# Patient Record
Sex: FEMALE | Race: WHITE
[De-identification: names, ages, dates, MRNs, and addresses within clinical notes are randomized per-mention and may not be internally consistent; named-entity substitution may affect disease eponyms.]

---

## 2017-01-26 NOTE — ER DOCUMENT REPORT
ED Medical Screen (RME)





- General


Stated Complaint: FOOT PAIN


Mode of Arrival: Ambulatory


Information source: Patient


Notes: 


She presents to the emergency department with complaints of right plantar foot 

pain for one month.  She is on her feet all day long.  Denies trauma.





I have greeted and performed a rapid initial assessment of this patient.  A 

comprehensive ED assessment and evaluation of the patient, analysis of test 

results and completion of the medical decision making process will be conducted 

by additional ED providers.


TRAVEL OUTSIDE OF THE [place] IN LAST 30 DAYS: No





Past Medical History


Past Surgical History: Reports: Hx Tonsillectomy





- Immunizations


Hx Diphtheria, Pertussis, Tetanus Vaccination: No





Physical Exam





- Vital signs


Vitals: 





 











Temp Pulse Resp BP Pulse Ox


 


 98.0 F   80   16   123/72   99 


 


 01/26/17 10:08  01/26/17 10:08  01/26/17 10:08  01/26/17 10:08  01/26/17 10:08














Course





- Vital Signs


Vital signs: 





 











Temp Pulse Resp BP Pulse Ox


 


 98.0 F   80   16   123/72   99 


 


 01/26/17 10:08  01/26/17 10:08  01/26/17 10:08  01/26/17 10:08  01/26/17 10:08

## 2018-08-07 NOTE — RADIOLOGY REPORT (SQ)
EXAM DESCRIPTION:  MRI RT LOWER JOINT WITHOUT



COMPLETED DATE/TIME:  8/6/2018 5:25 pm



REASON FOR STUDY:  PAIN IN RIGHT ANKLE AND JOINTS OF RIGHT M25.571  PAIN IN RIGHT ANKLE AND JOINTS OF
 RIGHT FOOT



COMPARISON:  None.



TECHNIQUE:  Right ankle images acquired and stored on PACS. Multiplanar images include fat sensitive 
sequences as T1, fluid sensitive sequences as FST2/STIR, cartilage sensitive sequences as FSPD, and g
radient echo sequences.



LIMITATIONS:  None.



FINDINGS:  BONE MARROW: Subchondral edema midline tibial plafond and along the posteromedial margin o
f the talus.

EFFUSIONS: Small ankle and subtalar joint effusions.  No definite loose bodies are identified.

OSSEOUS ARTICULATIONS: Intact.

TALAR DOME AND TIBIAL PLAFOND: Intact cartilage. No osteochondral defect.

ACHILLES TENDON: Intact without partial or full-thickness tear.  No adjacent bursal fluid or edema.

TIBIALIS ANTERIOR TENDON: Intact without edema at the 1st MT attachment.

TIBIALIS POSTERIOR TENDON: Normal morphology and no edema at the navicular attachment. No tendon shea
th fluid.

FLEXOR HALLUCIS LONGUS AND FLEXOR DIGITORUM TENDONS: Normal morphology and no tendon sheath fluid. No
 edema of the os trigonum.

PERONEUS LONGUS AND BREVIS TENDON: Chevron sign in the peroneus brevis tendon.  Mildly increased flui
d in the tendon sheath.

ATFL, CFL, PTFL: The ATFL is torn.  The CFL is attenuated but intact.  Tibiofibular ligaments intact.


DELTOID LIGAMENT: Visualized components intact.

TARSAL TUNNEL: No masses. No muscle atrophy.

SINUS TARSI: No fluid. No reactive marrow edema or erosions.

PLANTAR FASCIA: No signal alteration or tear.

ADJACENT SOFT TISSUES: No masses.

OTHER: No other significant finding.



IMPRESSION:

1. Torn ATFL.

2. Split tear of the peroneus brevis tendon.

3. Mild subchondral edema in the tibial plafond and medial margin of the talus.



TECHNICAL DOCUMENTATION:  JOB ID: [id number]

 2011 [employer]- All Rights Reserved



Reading location - IP/workstation name: [email or web address]

## 2020-05-13 ENCOUNTER — HOSPITAL ENCOUNTER (INPATIENT)
Dept: HOSPITAL 62 - LR | Age: 35
LOS: 3 days | Discharge: HOME | End: 2020-05-16
Attending: OBSTETRICS & GYNECOLOGY | Admitting: OBSTETRICS & GYNECOLOGY
Payer: MEDICAID

## 2020-05-13 DIAGNOSIS — O48.0: Primary | ICD-10-CM

## 2020-05-13 DIAGNOSIS — Z14.1: ICD-10-CM

## 2020-05-13 DIAGNOSIS — Z3A.40: ICD-10-CM

## 2020-05-13 LAB
ADD MANUAL DIFF: NO
BARBITURATES UR QL SCN: NEGATIVE
BASOPHILS # BLD AUTO: 0 10^3/UL (ref 0–0.2)
BASOPHILS NFR BLD AUTO: 0.3 % (ref 0–2)
EOSINOPHIL # BLD AUTO: 0.1 10^3/UL (ref 0–0.6)
EOSINOPHIL NFR BLD AUTO: 0.8 % (ref 0–6)
ERYTHROCYTE [DISTWIDTH] IN BLOOD BY AUTOMATED COUNT: 12.9 % (ref 11.5–14)
HCT VFR BLD CALC: 35.3 % (ref 36–47)
HGB BLD-MCNC: 12.6 G/DL (ref 12–15.5)
LYMPHOCYTES # BLD AUTO: 2.4 10^3/UL (ref 0.5–4.7)
LYMPHOCYTES NFR BLD AUTO: 20.7 % (ref 13–45)
MCH RBC QN AUTO: 32 PG (ref 27–33.4)
MCHC RBC AUTO-ENTMCNC: 35.5 G/DL (ref 32–36)
MCV RBC AUTO: 90 FL (ref 80–97)
METHADONE UR QL SCN: NEGATIVE
MONOCYTES # BLD AUTO: 0.8 10^3/UL (ref 0.1–1.4)
MONOCYTES NFR BLD AUTO: 6.6 % (ref 3–13)
NEUTROPHILS # BLD AUTO: 8.2 10^3/UL (ref 1.7–8.2)
NEUTS SEG NFR BLD AUTO: 71.6 % (ref 42–78)
PCP UR QL SCN: NEGATIVE
PLATELET # BLD: 280 10^3/UL (ref 150–450)
RBC # BLD AUTO: 3.92 10^6/UL (ref 3.72–5.28)
TOTAL CELLS COUNTED % (AUTO): 100 %
URINE AMPHETAMINES SCREEN: NEGATIVE
URINE BENZODIAZEPINES SCREEN: NEGATIVE
URINE COCAINE SCREEN: NEGATIVE
URINE MARIJUANA (THC) SCREEN: NEGATIVE
WBC # BLD AUTO: 11.5 10^3/UL (ref 4–10.5)

## 2020-05-13 PROCEDURE — 86850 RBC ANTIBODY SCREEN: CPT

## 2020-05-13 PROCEDURE — 80307 DRUG TEST PRSMV CHEM ANLYZR: CPT

## 2020-05-13 PROCEDURE — 85025 COMPLETE CBC W/AUTO DIFF WBC: CPT

## 2020-05-13 PROCEDURE — 86901 BLOOD TYPING SEROLOGIC RH(D): CPT

## 2020-05-13 PROCEDURE — 94760 N-INVAS EAR/PLS OXIMETRY 1: CPT

## 2020-05-13 PROCEDURE — 36415 COLL VENOUS BLD VENIPUNCTURE: CPT

## 2020-05-13 PROCEDURE — 86900 BLOOD TYPING SEROLOGIC ABO: CPT

## 2020-05-13 PROCEDURE — 86592 SYPHILIS TEST NON-TREP QUAL: CPT

## 2020-05-13 PROCEDURE — 85027 COMPLETE CBC AUTOMATED: CPT

## 2020-05-13 NOTE — ADMISSION PHYSICAL
=================================================================



=================================================================

Datetime Report Generated by CPN: 2020 18:58

   

   

=================================================================

CURRENT ADMISSION

=================================================================

   

Chief Complaint:  Scheduled Induction of Labor

Indication for Induction:  Not Applicable

Admit Impression :  Term, Intrauterine Pregnancy; No Active Labor;

   Intact Membranes; Induction of Labor

Admit Plan:  Admit to Unit; Initiate Labor Induction Protocol

   

=================================================================

ALLERGIES

=================================================================

   

Medication Allergies:  Yes

Medication Allergies:  Penicillins (2020); amoxicillin

   (2020); meperidine (2020)

Latex:  No Latex Allergies

   

=================================================================

OBSTETRICAL HISTORY

=================================================================

   

EDC:  2020 00:00

:  2

Para:  1

Term:  1

:  1

SAB:  1

IAB:  1

Ectopic:  0

Livin

Cesareans:  0

VBACs:  0

Multiple Births:  0

Gestational Diabetes:  No

Rh Sensitization:  No

Incompetent Cervix:  No

MORE:  No

Infertility:  No

ART Treatment:  No

Uterine Anomaly:  No

IUGR:  No

Hx Previous C/S:  No

Macrosomia:  No

Hx Loss/Stillborn:  No

PIH:  No

Hx  Death:  No

Placenta Previa/Abruption:  No

Depression/PP Depression:  No

PTL/PROM:  No

Post Partum Hemorrhage:  No

Current Pregnancy Procedures:  Ultrasound

Obstetrical History Comments:  G1- 41wks  viable baby boy 

   G2- Current 

   

=================================================================

***SEE PRENATAL RECORDS***

=================================================================

   

Alcohol:  No

Marijuana :  No

Cocaine:  No

Other Illicit Drugs:  No

Cigarettes:  Never Smoker. [id number]

   

=================================================================

MEDICAL HISTORY

=================================================================

   

Diabetes:  No

Blood Transfusion:  No

Pulmonary Disease (Asthma, TB):  No

Breast Disease:  No

Hypertension:  No

Gyn Surgery:  No

Heart Disease:  No

Hosp/Surgery:  Yes

Autoimmune Disorder:  No

Anesthetic Complications:  No

Kidney Disease:  No

Abnormal Pap Smear:  No

Neuro/Epilepsy:  No

Psychiatric Disorders:  No

Other Medical Diseases:  No

Hepatitis/Liver Disease:  No

Significant Family History:  No

Varicosities/Phlebitis:  No

Trauma/Violence :  No

Thyroid Dysfunction:  No

   

=================================================================

INFECTIOUS HISTORY

=================================================================

   

Gonorrhea:  No

Genital Herpes:  No

Chlamydia:  No

Tuberculosis:  No

Syphilis:  No

Hepatitis:  No

HIV/AIDS Exposure:  No

Rash or Viral Illness:  No

HPV:  No

   

=================================================================

PHYSICAL EXAM

=================================================================

   

General:  Normal

HEENT:  Normal

Neurologic:  Normal

Thyroid:  Normal

Heart:  Normal

Lungs:  Normal

Breast:  Normal

Back:  Normal

Abdomen:  Normal

Genitourinary Exam:  Normal

Extremities:  Normal

DTRs:  Normal

Pelvic Type:  Adequate

Vital Signs:  Reviewed; Within Normal Limits

   

=================================================================

VAGINAL EXAM

=================================================================

   

Dilatation:  1

Effacement:  50

Station:  -2

Contraction Comments:  no regular 

   

=================================================================

MEMBRANES

=================================================================

   

Membranes:  Intact

   

=================================================================

FETUS A

=================================================================

   

EGA:  40.0

Monitoring:  External US

FHR- Baseline:  145

Variability:  Moderate 6-25bpm

Accelerations:  15X15

Decelerations:  None

FHR Category:  Category I

Fetal Presentation:  Vertex

Admit Comment:  34 yo  at 40.0 wks EGA for IOL at 41.1 wks EGA

   for post dates

   -Admit LDR for scheduled IOL

   -Ice chips only when in active labor. Start IVFs : LR at 125 cc/hr

   now

   -CEFM and [place]

   -GBS negative, A positive blood type, G/C negative, RI, varicella

   immune

   -Cervidil 10mg PV this PM

   -Hx of one prior , anticipate 

      

   

=================================================================

PLANS FOR LABOR AND DELIVERY

=================================================================

   

Labor and Delivery:  None

Pain Management:  None

Feeding Preference:  Formula

Benefit of Breast Feed Discussed:  Yes

Circumcision:  Yes

   

=================================================================

INFORMED CONSENT

=================================================================

   

Informed Consent Obtained:  Vaginal Delivery;  Section

   Delivery; Induction of Labor; Risks, Benefits and Alternatives

   Discussed

Signature:  Electronically signed by Gayathri Fletcher MD on 2020 at

   18:58  with User ID: [email or web address]

:  Electronically signed by Gayathri Fletcher MD on 2020 at 18:58 

   with User ID: [email or web address]

## 2020-05-14 RX ADMIN — DOCUSATE SODIUM SCH MG: 100 CAPSULE, LIQUID FILLED ORAL at 17:50

## 2020-05-14 RX ADMIN — IBUPROFEN SCH MG: 800 TABLET, FILM COATED ORAL at 14:07

## 2020-05-14 RX ADMIN — IBUPROFEN SCH MG: 800 TABLET, FILM COATED ORAL at 21:35

## 2020-05-14 RX ADMIN — FERROUS SULFATE TAB 325 MG (65 MG ELEMENTAL FE) SCH MG: 325 (65 FE) TAB at 17:50

## 2020-05-14 RX ADMIN — FAMOTIDINE SCH MG: 20 TABLET, FILM COATED ORAL at 21:35

## 2020-05-14 NOTE — WARNING SIGNS IN BABIES
=================================================================

VOD Warning Signs

=================================================================

Datetime Report Generated by [facility]: 05/14/2020 13:23

   

VOD#608 -Warning Signs in Babies:  Needs to be viewed.    (05/13/2020

   18:12:Adilene Prather RN)

## 2020-05-14 NOTE — DELIVERY SUMMARY
=================================================================

Del Sum A-C

=================================================================

Datetime Report Generated by CPN: 2020 14:00

   

   

=================================================================

DELIVERY PERSONNEL

=================================================================

   

DELIVERY PERSONNEL:  [id number]

Delivery Doctor::  Kelly Lira CNM

Nurse Midwife Certified::  Kelly Lira CNM

Labor and Delivery Nurse::  Adilene Prather RN

Nursery Nurse::  Nicci Mooney RN

Scrsteffany Tech/CNA:  Adele Dill CNA II

   

=================================================================

MATERNAL INFORMATION

=================================================================

   

Delivery Anesthesia:  None

Medications After Delivery:  Pitocin 30 Units in 500ml NS/D5W

Delivery QBL:  150

Maternal Complications:  None

Provider Comments:  pt quickly progressed to c/c/+1 with urge to push,

   began pushing and able to deliver the fetal head at which point

   patient stopped pushing. Fetal head rotated from IRVIN to EMERSON then

   patient began pushing again and able to deliver anterior shoulder

   then quickly delivered the rest of the body. Baby with vigorous

   respiratory effort and cry with tactile stimulation. Baby placed on

   maternal abdomen, cord allowed to stop pulsating then clamped x2 and

   cut by FOB (cord blood collected,3vc noted). Placenta delivered

   spontaneously intact with central insertion. Fundus firm @ u-2,

   bleeding stable. Vaginal and perineal inspection revealed no

   lacerations. Mother and baby remain skin to skin and bonding at this

   time. 

      

   

=================================================================

LABOR SUMMARY

=================================================================

   

EDC:  2020 00:00

No. Babies in Womb:  1

 Attempted:  No

Labor Anesthesia:  None

   

=================================================================

LABOR INFORMATION

=================================================================

   

Reason for Induction:  Post Dates

Onset of Labor:  2020 05:32

Complete Dilatation:  2020 11:34

Cervical Ripening Agents:  Cytotec @

Oxytocin:  Induction

Group B Beta Strep:  NEGATIVE

Antibiotics # of Doses:  0

Antibiotics Time of Last Dose:  N/A

Steroids Given:  None

Reason Steroids Not Administered:  Not Applicable

   

=================================================================

MEMBRANES

=================================================================

   

Membranes Rupture Method:  Artificial

Rupture of Membranes:  2020 08:40

Length of Rupture (hr):  3.50

Amniotic Fluid Color:  Clear

Amniotic Fluid Amount:  Moderate

Amniotic Fluid Odor:  None

   

=================================================================

STAGES OF LABOR

=================================================================

   

Stage 1 hr:  6

Stage 1 min:  2

Stage 2 hr:  0

Stage 2 min:  36

Stage 3 hr:  0

Stage 3 min:  12

Total Time in Labor hr:  6

Total Time in Labor min:  50

   

=================================================================

VAGINAL DELIVERY

=================================================================

   

Episiotomy:  None

Laceration #1:  None

Laceration Extension #1:  N/A

Laceration Repair:  Not Applicable

Sponge Count Correct:  Yes

Sharps Count Correct:  N/A

   

=================================================================

CSECTION DELIVERY

=================================================================

   

Primary Indication:  N/A

Secondary Indication:  N/A

CSection Incidence:  N/A

Labor:  N/A

Elective:  N/A

CSection Incision:  N/A

   

=================================================================

BABY A INFORMATION

=================================================================

   

Infant Delivery Date/Time:  2020 12:10

Method of Delivery:  Vaginal

Nurse Controlled Delivery:  No

Born in Route :  No

:  N/A

Forceps:  N/A

Vacuum Extraction:  N/A

Shoulder Dystocia :  No

   

=================================================================

PRESENTATION/POSITION BABY A

=================================================================

   

Presentation:  Cephalic

Cephalic Presentation:  Vertex

Vertex Position:  Right Occipital Anterior

Breech Presentation:  N/A

   

=================================================================

PLACENTA INFORMATION BABY A

=================================================================

   

Placenta Delivery Time :  2020 12:22

Placenta Method of Delivery:  Spontaneous

Placenta Status:  Delivered

   

=================================================================

APGAR SCORES BABY A

=================================================================

   

Heart Rate 1 min:  >100 bpm

Resp Effort 1 min:  Good Cry

Reflex Irritability 1 min:  Cough or Sneeze or Pulls Away

Muscle Tone 1 min:  Active Motion

Color 1 min:  Blue/Pale

Resuscitation Effort 1 min:  Tactile Stimulation

APGAR SCORE 1 MIN:  8

Heart Rate 5 min:  >100 bpm

Resp Effort 5 min:  Good Cry

Reflex Irritability 5 min:  Cough or Sneeze or Pulls Away

Muscle Tone 5 min:  Active Motion

Color 5 min:  Body Pink, Extremities Blue

Resuscitation Effort 5 min:  N/A

APGAR SCORE 5 MIN:  9

Resuscitation Effort 10 min:  N/A

   

=================================================================

INFANT INFORMATION BABY A

=================================================================

   

Gestational Age at Delivery:  41.1

Gestational Status:  Late Term-  41- 41.6 Weeks

Infant Outcome :  Liveborn

Infant Condition :  Stable

Infant Sex:  Male

   

=================================================================

IDENTIFICATION BABY A

=================================================================

   

Infant Verification Date/Time:  2020 13:31

ID Band Number:  [id number]

Mother's Name Verified:  Yes

Infant Medical Record Number:  [id number]

RN Verifying Infant:  JNiebuhr,RN

Additional Verifying Personnel:  SCamp,RN

   

=================================================================

WEIGHT/LENGTH BABY A

=================================================================

   

Infant Birthweight (gm):  3690

Infant Weight (lb):  8

Infant Weight (oz):  2

Infant Length (in):  19.50

Infant Length (cm):  49.53

   

=================================================================

CORD INFORMATION BABY A

=================================================================

   

No. Cord Vessels:  3

Nuchal Cord :  N/A

Cord Blood Taken:  Yes-For Storage (Mom's Blood type +)

Infant Suction:  None

   

=================================================================

ASSESSMENT BABY A

=================================================================

   

Infant Complications:  None

Physical Findings at Delivery:  Molding of the Head

Infant Respirations:  Appears Normal

Skin to Skin:  Yes

Skin to Skin Time (min):  30

Neonatologist/ALS Called :  No

Infant Care By:  CONNIE Azul

Transferred To:  Remains with Mother

   

=================================================================

BABY B INFORMATION

=================================================================

   

 :  N/A

   

=================================================================

SIGNATURES

=================================================================

   

Assignment:  Antwon Mendez MD

Signature:  Electronically signed by Kelly Lira CNM on

   2020 at 12:58  with User ID: [email or web address]

:  Electronically signed by Kelly Lira CNM on 2020 at

   12:58  with User ID: [email or web address]

## 2020-05-15 LAB
ERYTHROCYTE [DISTWIDTH] IN BLOOD BY AUTOMATED COUNT: 13.2 % (ref 11.5–14)
HCT VFR BLD CALC: 29.2 % (ref 36–47)
HGB BLD-MCNC: 10.4 G/DL (ref 12–15.5)
MCH RBC QN AUTO: 32.8 PG (ref 27–33.4)
MCHC RBC AUTO-ENTMCNC: 35.5 G/DL (ref 32–36)
MCV RBC AUTO: 92 FL (ref 80–97)
PLATELET # BLD: 241 10^3/UL (ref 150–450)
RBC # BLD AUTO: 3.17 10^6/UL (ref 3.72–5.28)
WBC # BLD AUTO: 15.8 10^3/UL (ref 4–10.5)

## 2020-05-15 RX ADMIN — IBUPROFEN SCH MG: 800 TABLET, FILM COATED ORAL at 05:44

## 2020-05-15 RX ADMIN — FERROUS SULFATE TAB 325 MG (65 MG ELEMENTAL FE) SCH MG: 325 (65 FE) TAB at 10:41

## 2020-05-15 RX ADMIN — DOCUSATE SODIUM SCH MG: 100 CAPSULE, LIQUID FILLED ORAL at 10:41

## 2020-05-15 RX ADMIN — SENNOSIDES, DOCUSATE SODIUM SCH EACH: 50; 8.6 TABLET, FILM COATED ORAL at 10:41

## 2020-05-15 RX ADMIN — Medication SCH CAP: at 10:41

## 2020-05-15 RX ADMIN — IBUPROFEN SCH MG: 800 TABLET, FILM COATED ORAL at 22:46

## 2020-05-15 RX ADMIN — DOCUSATE SODIUM SCH MG: 100 CAPSULE, LIQUID FILLED ORAL at 17:34

## 2020-05-15 RX ADMIN — FAMOTIDINE SCH MG: 20 TABLET, FILM COATED ORAL at 10:41

## 2020-05-15 RX ADMIN — IBUPROFEN SCH MG: 800 TABLET, FILM COATED ORAL at 13:27

## 2020-05-15 RX ADMIN — FAMOTIDINE SCH MG: 20 TABLET, FILM COATED ORAL at 22:46

## 2020-05-15 RX ADMIN — FERROUS SULFATE TAB 325 MG (65 MG ELEMENTAL FE) SCH MG: 325 (65 FE) TAB at 17:34

## 2020-05-15 NOTE — PDOC PROGRESS REPORT
Subjective-OB


Progress Note for:: 05/15/20


Subjective: 


Pt doing well, no concerns.  She reports light bleeding, reg diet and voiding 

without difficulty. 








Physical Exam (OB)


Vital Signs: 


                                        











Temp Pulse Resp BP Pulse Ox


 


 97.9 F   78   18   100/63   99 


 


 05/15/20 07:21  05/15/20 07:21  05/15/20 07:21  05/15/20 07:21  05/15/20 07:21








                                 Intake & Output











 05/14/20 05/15/20 05/16/20





 06:59 06:59 06:59


 


Weight 110.5 kg  














- PIH/Pre-Eclampsia


Clonus: Negative


Headache: Absent


Epigastric Pain: No


Visual Changes: No





- Lochia


Lochia Amount: Small 10-25 ml


Lochia Color: Rubra/Red





- Abdomen


Description: Soft, Round


Hernia Present: No


Fundal Description: Firm, Midline


Fundal Height: u/u - u/2





Objective-Diagnostic


Laboratory: 


                                        





                                 05/15/20 06:30 





                                        











  05/15/20





  06:30


 


WBC  15.8 H


 


RBC  3.17 L


 


Hgb  10.4 L D


 


Hct  29.2 L


 


MCV  92


 


MCH  32.8


 


MCHC  35.5


 


RDW  13.2


 


Plt Count  241














Assessment and Plan(PN)





- Assessment and Plan


(1) Delivery normal


Is this a current diagnosis for this admission?: Yes   





(2) Encounter for induction of labor


Is this a current diagnosis for this admission?: Yes   





(3) Gestational diabetes mellitus (GDM) affecting second pregnancy


Is this a current diagnosis for this admission?: Yes   





- Time Spent with Patient


Time with patient: Less than 15 minutes


Medications reviewed and adjusted accordingly: Yes





- Disposition


Anticipated Discharge: Home


Within: within 24 hours

## 2020-05-16 VITALS — DIASTOLIC BLOOD PRESSURE: 64 MMHG | SYSTOLIC BLOOD PRESSURE: 114 MMHG

## 2020-05-16 RX ADMIN — SENNOSIDES, DOCUSATE SODIUM SCH EACH: 50; 8.6 TABLET, FILM COATED ORAL at 10:23

## 2020-05-16 RX ADMIN — FAMOTIDINE SCH MG: 20 TABLET, FILM COATED ORAL at 10:23

## 2020-05-16 RX ADMIN — FERROUS SULFATE TAB 325 MG (65 MG ELEMENTAL FE) SCH MG: 325 (65 FE) TAB at 10:23

## 2020-05-16 RX ADMIN — Medication SCH CAP: at 10:23

## 2020-05-16 RX ADMIN — IBUPROFEN SCH MG: 800 TABLET, FILM COATED ORAL at 05:36

## 2020-05-16 RX ADMIN — DOCUSATE SODIUM SCH MG: 100 CAPSULE, LIQUID FILLED ORAL at 10:23

## 2020-05-16 NOTE — PDOC DISCHARGE SUMMARY
Impression





- Admit/DC Date/PCP


Admission Date/Primary Care Provider: 


  05/13/20 18:03





  LIZBETH SHEFFIELD MD





Discharge Date: 05/16/20





- Discharge Diagnosis


(1) Delivery normal


Is this a current diagnosis for this admission?: Yes   





(2) Encounter for induction of labor


Is this a current diagnosis for this admission?: Yes   





(3) Gestational diabetes mellitus (GDM) affecting second pregnancy


Is this a current diagnosis for this admission?: Yes   





- Additional Information


Resuscitation Status: Full Code


Discharge Diet: Regular


Discharge Activity: Balance Activity w/Rest, Pelvic Rest


Referrals: 


LIZBETH SHEFFIELD MD [Primary Care Provider] - 


Prescriptions: 


Ibuprofen [Motrin 800 mg Tablet] 800 mg PO Q8HP PRN #60 tablet


 PRN Reason: 


Home Medications: 








Prenatal Vits96/Iron Fum/Folic [Prenatal Tablet] 1 each PO DAILY 05/13/20 


Ibuprofen [Motrin 800 mg Tablet] 800 mg PO Q8HP PRN #60 tablet 05/16/20 











HPI


Gestational Age: 41.1


Reason(s) for Admission: Induction of Labor, Gestional Diabetes


Prenatal Procedures: NST


Intrapartum Procedure(s): Spontaneous Vaginal Delivery





Results


Laboratory Results: 


                                        











WBC  15.8 10^3/uL (4.0-10.5)  H  05/15/20  06:30    


 


RBC  3.17 10^6/uL (3.72-5.28)  L  05/15/20  06:30    


 


Hgb  10.4 g/dL (12.0-15.5)  L D 05/15/20  06:30    


 


Hct  29.2 % (36.0-47.0)  L  05/15/20  06:30    


 


MCV  92 fl (80-97)   05/15/20  06:30    


 


MCH  32.8 pg (27.0-33.4)   05/15/20  06:30    


 


MCHC  35.5 g/dL (32.0-36.0)   05/15/20  06:30    


 


RDW  13.2 % (11.5-14.0)   05/15/20  06:30    


 


Plt Count  241 10^3/uL (150-450)   05/15/20  06:30    


 


Lymph % (Auto)  20.7 % (13-45)   05/13/20  19:05    


 


Mono % (Auto)  6.6 % (3-13)   05/13/20  19:05    


 


Eos % (Auto)  0.8 % (0-6)   05/13/20  19:05    


 


Baso % (Auto)  0.3 % (0-2)   05/13/20  19:05    


 


Absolute Neuts (auto)  8.2 10^3/uL (1.7-8.2)   05/13/20  19:05    


 


Absolute Lymphs (auto)  2.4 10^3/uL (0.5-4.7)   05/13/20  19:05    


 


Absolute Monos (auto)  0.8 10^3/uL (0.1-1.4)   05/13/20  19:05    


 


Absolute Eos (auto)  0.1 10^3/uL (0.0-0.6)   05/13/20  19:05    


 


Absolute Basos (auto)  0.0 10^3/uL (0.0-0.2)   05/13/20  19:05    


 


Seg Neutrophils %  71.6 % (42-78)   05/13/20  19:05    


 


Urine Opiates Screen  NEGATIVE   05/13/20  18:12    


 


Urine Methadone Screen  NEGATIVE   05/13/20  18:12    


 


Ur Barbiturates Screen  NEGATIVE   05/13/20  18:12    


 


Ur Phencyclidine Scrn  NEGATIVE   05/13/20  18:12    


 


Ur Amphetamines Screen  NEGATIVE   05/13/20  18:12    


 


U Benzodiazepines Scrn  NEGATIVE   05/13/20  18:12    


 


Urine Cocaine Screen  NEGATIVE   05/13/20  18:12    


 


U Marijuana (THC) Screen  NEGATIVE   05/13/20  18:12    


 


RPR  NONREACTIVE  (NONREACTIVE)   05/13/20  19:05    


 


Blood Type  A POSITIVE   05/13/20  19:05    


 


Antibody Screen  NEGATIVE   05/13/20  19:05    














Plan


Plan of Treatment: 


f/u at [facility] for PPCK 4 wks


Time Spent: Less than 30 Minutes